# Patient Record
Sex: MALE | Race: WHITE
[De-identification: names, ages, dates, MRNs, and addresses within clinical notes are randomized per-mention and may not be internally consistent; named-entity substitution may affect disease eponyms.]

---

## 2017-03-16 ENCOUNTER — HOSPITAL ENCOUNTER (EMERGENCY)
Dept: HOSPITAL 75 - ER | Age: 32
Discharge: HOME | End: 2017-03-16
Payer: SELF-PAY

## 2017-03-16 VITALS — DIASTOLIC BLOOD PRESSURE: 86 MMHG | SYSTOLIC BLOOD PRESSURE: 132 MMHG

## 2017-03-16 VITALS — BODY MASS INDEX: 23.62 KG/M2 | WEIGHT: 190 LBS | HEIGHT: 75 IN

## 2017-03-16 DIAGNOSIS — T57.8X1A: ICD-10-CM

## 2017-03-16 DIAGNOSIS — T26.91XA: Primary | ICD-10-CM
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
## 2017-03-16 PROCEDURE — 99283 EMERGENCY DEPT VISIT LOW MDM: CPT

## 2017-03-16 NOTE — ED EENT
History of Present Illness


General


Chief Complaint:  Eye Problems


Stated Complaint:  EYE PROBLEMS





History of Present Illness


Time seen by provider:  16:25


Initial Comments


Evaluation for chemical exposure to the right eye. Received call from [employer] that the patient had been exposed to [employer] Primer, it is used as a 

primer/ for PVC pipe. It splattered into his right eye. He rinsed his 

eye in the sink , immediately after this happened.  He was not wearing contacts 

glasses or safety goggles. He denies any previous injuries to his right eye. He 

reports minimal pain in his right eye and immediately after it happened he had 

photophobia but that has since resolved


Location Injury Occurred:  1530


Timing/Duration:  abrupt


Severity:  mild


Location:  eye (R)


Prearrival Treatment:  flushing eyes


Associated Symptoms:   denies symptoms





Allergies and Home Medications


Allergies


Coded Allergies:  


     No Known Drug Allergies (Verified  Allergy, Unknown, 11/12/08)





Home Medications


Chepe/Polymyx B Sulf/Dexameth 5 Ml Drops.susp #1 2 DROPS OD Q6H 


   2 drops right eye 7 days. 


   Prescribed by: SHERRON MERAZ on 3/16/17 [id number]





Review of Systems


Constitutional:   no symptoms reported see HPI


Eyes:   See HPIDenies Blindness,  Blurred VisionDenies Drainage, Denies 

Decreased Acuity,  Foreign Body Sensation InflammationDenies Photophobia, 

Denies Previous Injury, Denies Tunnel Vision, Denies Vision Changes, Denies 

Contact Lenses, Denies Glasses


Ears:   No Symptoms Reported See HPI


Nose:   no symptoms reported see HPI


Mouth:   no symptoms reported see HPI


Throat:   no symptoms reported see HPI


Respiratory:   no symptoms reported see HPI


Cardiovascular:   no symptoms reported see HPI


Gastrointestinal:   no symptoms reported see HPI


Musculoskeletal:   no symptoms reported see HPI


Skin:   no symptoms reported see HPI


Neurological:   No Symptoms Reported See HPI


Hematologic/Lymphatic:   No Symptoms Reported See HPI


Immunological/Allergic:   no symptoms reported see HPI


All Other Systems Reviewed


Negative Unless Noted:  Yes





Past Medical-Social-Family Hx


Patient Social History


Recent Foreign Travel:  No


Contact w/Someone Who Travel:  No





Musculoskeletal


Hx Musculoskeletal Disorders:  Yes (OSGOOD-sCHLATTER'S DISEASE)





Endocrine


Hx Endocrine Disorders:  No





HEENT


HX ENT Disorders:  No





Reviewed Nursing Assessment


Reviewed/Agree w Nursing PMH:  Yes





Visual Acuity :  


   Eye Location:  Right


   Vision Acuity Degree:  20/30 (Left 20/20 and OU 20/20)


Physical Exam


Vital Signs





 Vital Sign - Last 12Hours








 3/16/17





 16:20


 


Temp 99.2


 


Pulse 79


 


Resp 18


 


B/P 145/92


 


Pulse Ox 96


 


O2 Delivery Room Air








General Appearance:   WD/WN no apparent distress


Eyes:  right eye conjunctival inflammation, bilateral eye EOMI, bilateral eye 

PERRL, bilateral eye normal inspection


Ears:  bilateral ear TM normal, bilateral ear auricle normal, bilateral ear 

canal normal


Nose:   normal inspectionNo active bleeding, No discharge, No sinus tenderness


Neck:   non-tender full range of motion supple normal inspection


Cardiovascular:   normal peripheral pulses regular rate, rhythm no murmur


Respiratory:   chest non-tender lungs clear normal breath sounds


Neurologic/Psychiatric:   no motor/sensory deficits alert normal mood/affect 

oriented x 3


Skin:   normal color warm/dry





Progress/Results/Core Measures


Results/Orders


My Orders





 Orders-SHERRON MERAZ Iv 1000 Ml (Sodium Chloride 0.9%) (3/16/17 16:41)





Medications Given in ED








 Current Medications








 Medications  Dose


 Ordered  Sig/Jenni


 Route  Start Time


 Stop Time Status Last Admin


Dose Admin


 


 Sodium Chloride  1,000 ml @ 


 0 mls/hr  Q0M ONCE


 IV  3/16/17 16:41


 3/16/17 16:43 DC 3/16/17 16:15


1,000 MLS/HR











Vital Signs/I&O





 Vital Sign - Last 12Hours








 3/16/17 3/16/17





 16:20 17:08


 


Temp 99.2 


 


Pulse 79 85


 


Resp 18 16


 


B/P 145/92 


 


Pulse Ox 96 99


 


O2 Delivery Room Air 








Progress Note :  


   Time:  16:25


Progress Note


Initial evaluation completed, irrigation with sterile normal saline started 

right eye at 1630. Per poison control they recommended 15 minute by rinse.


1645 patient completed irrigation of the right eye, reports less irritation and 

no pain in the right side. Ophthalmoscopic exam normal. Discharge plans made 

patient agreed with this





Departure


Impression


Impression:  


 Primary Impression:  


 Chemical insult, eye


 Qualified Code:  T26.41XA - Burn of right eye and adnexa, part unspecified, 

initial encounter


Disposition:  01 HOME, SELF-CARE


Condition:  Improved





Departure-Patient Inst.


Decision time for Depature:  16:45


Referrals:  


NO,LOCAL PHYSICIAN (PCP)


Primary Care Physician


Patient Instructions:  Chemical Eye Injury (DC)





Add. Discharge Instructions:


All discharge instructions reviewed with patient and/or family. Voiced 

understanding.


Return to emergency room for any visual disturbance, eye pain, or changes in 

symptoms.


Use eyedrops as prescribed.


Scripts


Chepe/Polymyx B Sulf/Dexameth (Ayciah-Pexay-Bruvvlkq Eye Drop)5 Ml Drops.susp2 

Drops OD Q6H #1 DROPS  Ref 0


   2 drops right eye 7 days.


   Prov:SHERRON MERAZ         3/16/17








SHERRON MERAZ Mar 16, 2017 16:34

## 2018-11-13 ENCOUNTER — HOSPITAL ENCOUNTER (EMERGENCY)
Dept: HOSPITAL 75 - ER | Age: 33
Discharge: HOME | End: 2018-11-13
Payer: COMMERCIAL

## 2018-11-13 VITALS — DIASTOLIC BLOOD PRESSURE: 90 MMHG | SYSTOLIC BLOOD PRESSURE: 131 MMHG

## 2018-11-13 VITALS — HEIGHT: 75 IN | WEIGHT: 190 LBS | BODY MASS INDEX: 23.62 KG/M2
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
## 2018-11-13 DIAGNOSIS — F17.210: ICD-10-CM

## 2018-11-13 DIAGNOSIS — Z77.22: ICD-10-CM

## 2018-11-13 DIAGNOSIS — Z23: ICD-10-CM

## 2018-11-13 DIAGNOSIS — S61.411A: Primary | ICD-10-CM
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
## 2018-11-13 PROCEDURE — 90715 TDAP VACCINE 7 YRS/> IM: CPT

## 2018-11-13 PROCEDURE — 12001 RPR S/N/AX/GEN/TRNK 2.5CM/<: CPT

## 2018-11-13 NOTE — ED LOWER EXTREMITY
General


Chief Complaint:  Laceration


Stated Complaint:  R HAND LACERATION AT WORK


Nursing Triage Note:  


right hand laceration


Nursing Sepsis Screen:  No Definite Risk


Source:  patient


Exam Limitations:  no limitations





History of Present Illness


Date Seen by Provider:  Nov 13, 2018


Time Seen by Provider:  19:35


Initial Comments


This 33-year-old gentleman presents to the emergency room with a laceration on 

his right hand.  He was using a metal lauren to show feed through a shoot at work 

when he struck his hand against a bowl head causing a laceration between the 

base of the right fourth and fifth fingers on the dorsal aspect of his hand.  

Bleeding is controlled.  He believes he has 45 years out from his last tetanus 

immunization.  He retains flexion and extension of the fingers as well as the 

lateral and medial range of motion in the fingers.





Allergies and Home Medications


Allergies


Coded Allergies:  


     No Known Drug Allergies (Verified  Allergy, Unknown, 11/12/08)





Home Medications


No Active Prescriptions or Reported Meds





Patient Home Medication List


Home Medication List Reviewed:  Yes





Review of Systems


Constitutional:  no symptoms reported


EENTM:  no symptoms reported


Respiratory:  no symptoms reported


Cardiovascular:  no symptoms reported


Gastrointestinal:  no symptoms reported


Genitourinary:  no symptoms reported


Musculoskeletal:  no symptoms reported


Skin:  see HPI


Psychiatric/Neurological:  No Symptoms Reported





Past Medical-Social-Family Hx


Patient Social History


Alcohol Use:  Denies Use


Recreational Drug Use:  No


Smoking Status:  Current Everyday Smoker


Type Used:  Cigarettes


2nd Hand Smoke Exposure:  Yes


Recent Foreign Travel:  No


Contact w/Someone Who Travel:  No


Recent Infectious Disease Expo:  No


Recent Hopitalizations:  No





Immunizations Up To Date


Tetanus Booster (TDap):  More than 5yrs


PED Vaccines UTD:  Yes





Seasonal Allergies


Seasonal Allergies:  No





Past Medical History


Surgeries:  Yes (knee)


Orthopedic


Respiratory:  No


Cardiac:  No


Neurological:  No


Reproductive Disorders:  No


Genitourinary:  No


Gastrointestinal:  No


Musculoskeletal:  Yes (OSGOOD-sCHLATTER'S DISEASE)


Endocrine:  No


Cancer:  No


Psychosocial:  No


Integumentary:  No


Blood Disorders:  No





Physical Exam


Vital Signs





Vital Signs - First Documented








 11/13/18





 19:37


 


Temp 99.1


 


Pulse 90


 


Resp 18


 


B/P (MAP) 131/90 (104)


 


Pulse Ox 99


 


O2 Delivery Room Air





Capillary Refill : Less Than 3 Seconds


Height, Weight, BMI


Height: 6'3.00"


Weight: 190lbs. oz. 86.607729kt;  BMI


Method:Stated


General Appearance:  WD/WN, no apparent distress


HEENT:  normal ENT inspection


Cardiovascular:  regular rate, rhythm, no edema


Respiratory:  lungs clear, normal breath sounds, no respiratory distress, no 

accessory muscle use


Neurologic/Tendon:  normal sensation, normal motor functions, normal tendon 

functions


Neurologic/Psychiatric:  CNs II-XII nml as tested, no motor/sensory deficits, 

alert, normal mood/affect, oriented x 3


Skin:  normal color, warm/dry, other (laceration as described below)


Range of motion is normal in the right hand.  Flexion and extension of the 

fingers is normal.  Strength and  is normal.  Lateral and medial motion of 

the fingers is also intact.  There is a 3 cm laceration in an L shape overlying 

the area between the fourth and fifth MCP joint and extending between the 

fingers on the distal hand.  Laceration is on the dorsal aspect.  There is some 

disruption of fascia but muscle tissue appears to be intact.  No foreign bodies 

were observed.





Procedures/Interventions





Other Wound Location


Distal dorsal right hand between the fourth and fifth MCP joint.


   Wound Length (cm):  3


   Wound's Depth, Shape:  linear, irregular, sub Q


   Wound Explored:  clean


   Irrigated w/ Saline (ccs):  250


   Betadine Prep?:  Yes


   Anesthesia:  1% Lidocaine


   Volume Anesthetic (ccs):  4


   Suture:  Prolene


   Suture Size:  4-0


   Number of Sutures:  7





Progress/Results/Core Measures


Results/Orders


My Orders





Orders - BRO SHARMA MD


Dipht,Pertoleg(Acell),Tet Adult (Boostrix (11/13/18 19:45)


Lidocaine 1% Inj 20 Ml (Xylocaine 1% Inj (11/13/18 19:45)





Medications Given in ED





Current Medications








 Medications  Dose


 Ordered  Sig/Jenni


 Route  Start Time


 Stop Time Status Last Admin


Dose Admin


 


 Diphtheria/


 Tetanus/Acell


 Pertussis  0.5 ml  ONCE ONCE


 IM  11/13/18 19:45


 11/13/18 19:46 DC 11/13/18 19:51


0.5 ML


 


 Lidocaine HCl  20 ml  ONCE  ONCE


 INJ  11/13/18 19:45


 11/13/18 19:46 DC 11/13/18 19:50


20 ML








Vital Signs/I&O











 11/13/18





 19:37


 


Temp 99.1


 


Pulse 90


 


Resp 18


 


B/P (MAP) 131/90 (104)


 


Pulse Ox 99


 


O2 Delivery Room Air














Blood Pressure Mean:  104











Progress


Progress Note :  


Progress Note


Patient received a tetanus booster.  Wound was anesthetized with lidocaine 

after cleaning the surrounding skin with alcohol.  Hand was scrubbed with 

chlorhexidine and sterile saline.  Wound was irrigated with 250 mL normal 

saline with a syringe under pressure.  Skin was prepped with Betadine and 

laceration was repaired with 4-0 Prolene in an interrupted fashion.  Patient 

tolerated the procedure well.  Repaired wound was dressed by nursing staff.  

Occupational health paperwork was completed.





Departure


Impression





 Primary Impression:  


 Laceration of right hand


 Qualified Codes:  S61.411A - Laceration without foreign body of right hand, 

initial encounter


Disposition:  01 HOME, SELF-CARE


Condition:  Improved





Departure-Patient Inst.


Referrals:  


NO,LOCAL PHYSICIAN (PCP/Family)


Primary Care Physician


Patient Instructions:  Laceration Repair With Stitches (DC)





Add. Discharge Instructions:  


Keep your hand clean and dry except for normal handwashing and showering.  Do 

not submerge.  Do not scrub directly over the sutures.


Monitor the wound for signs of infection such as increasing redness, increasing 

swelling, increasing pain, puslike drainage, or fever.  Return promptly to care 

if you notice these symptoms.  


Cover your hand when working or active to prevent snagging the sutures.  Also 

keep covered anytime your in a dirty or gopal environment.


You should have light duty with the right hand only until cleared by 

occupational health.


I recommend you have the sutures removed in about 10 days.


Return to care if you have any other problems or concerns.











All discharge instructions reviewed with patient and/or family. Voiced 

understanding.


Scripts


No Active Prescriptions or Reported Meds











BRO SHARMA MD Nov 13, 2018 20:24